# Patient Record
Sex: FEMALE | Race: WHITE | NOT HISPANIC OR LATINO | URBAN - NONMETROPOLITAN AREA
[De-identification: names, ages, dates, MRNs, and addresses within clinical notes are randomized per-mention and may not be internally consistent; named-entity substitution may affect disease eponyms.]

---

## 2018-05-09 NOTE — PATIENT DISCUSSION
Consider MRSA. We ask the patient to begin erythromycin BID OU. Follow up with Dr. Dominga Petersen.

## 2018-06-14 NOTE — PATIENT DISCUSSION
continue e-mycin bid OU, lid scrubs, and start tobradex gtts bid OU. Use tobradex qid OU for 1 week then BID OU x 1 month.

## 2019-09-25 ENCOUNTER — IMPORTED ENCOUNTER (OUTPATIENT)
Dept: URBAN - NONMETROPOLITAN AREA CLINIC 1 | Facility: CLINIC | Age: 49
End: 2019-09-25

## 2019-09-25 PROBLEM — H52.4: Noted: 2019-09-25

## 2019-09-25 PROCEDURE — S0620 ROUTINE OPHTHALMOLOGICAL EXA: HCPCS

## 2019-09-25 PROCEDURE — 92310 CONTACT LENS FITTING OU: CPT

## 2019-09-25 NOTE — PATIENT DISCUSSION
Presbyopia-Discussed diagnosis with patient. CL wear-CLs fit and center well.-Stressed that patient should not sleep in CL. -Updated CL Rx given. -CL care and precautions given. MONO OD ONLY NEAR

## 2019-10-17 ENCOUNTER — IMPORTED ENCOUNTER (OUTPATIENT)
Dept: URBAN - NONMETROPOLITAN AREA CLINIC 1 | Facility: CLINIC | Age: 49
End: 2019-10-17

## 2022-04-09 ASSESSMENT — VISUAL ACUITY
OD_CC: J1
OS_CC: J1
OD_SC: 20/20
OS_SC: 20/20